# Patient Record
Sex: MALE | Race: WHITE | NOT HISPANIC OR LATINO | Employment: OTHER | URBAN - METROPOLITAN AREA
[De-identification: names, ages, dates, MRNs, and addresses within clinical notes are randomized per-mention and may not be internally consistent; named-entity substitution may affect disease eponyms.]

---

## 2017-04-11 RX ORDER — CALCIUM CITRATE/VITAMIN D3 200MG-6.25
TABLET ORAL
Qty: 300 STRIP | Refills: 11 | Status: SHIPPED | OUTPATIENT
Start: 2017-04-11

## 2017-04-12 DIAGNOSIS — E11.9 DIABETES MELLITUS WITHOUT COMPLICATION: ICD-10-CM

## 2017-05-02 RX ORDER — GLUCOSAM/CHON-MSM1/C/MANG/BOSW 500-416.6
TABLET ORAL
Qty: 300 EACH | Refills: 11 | Status: SHIPPED | OUTPATIENT
Start: 2017-05-02

## 2017-05-18 ENCOUNTER — TELEPHONE (OUTPATIENT)
Dept: OPHTHALMOLOGY | Facility: CLINIC | Age: 77
End: 2017-05-18

## 2017-06-06 ENCOUNTER — TELEPHONE (OUTPATIENT)
Dept: FAMILY MEDICINE | Facility: CLINIC | Age: 77
End: 2017-06-06

## 2017-06-06 NOTE — TELEPHONE ENCOUNTER
----- Message from Gume Rucker sent at 6/6/2017  9:51 AM CDT -----  Contact: HRMARYCHUY  Good morning. Patient requesting a call to discuss his oxygen. Thanks.

## 2017-06-06 NOTE — LETTER
June 9, 2017    Silverio BRANDON Jakub  [address] [facility] [address]             [facility]  [address] [facility], [address]  [facility] [address]  Phone: [phone]  Fax: [phone] Dear Mr. Call:    I am unable to reach you via home phone (says memory is full) and your cell # says not set up. Please call [phone] in order to help you with your oxygen questions     If you have any questions or concerns, please don't hesitate to call.    Sincerely,    Татьяна Ly LPN   For Dr. Russell Mayo

## 2018-02-19 ENCOUNTER — PES CALL (OUTPATIENT)
Dept: ADMINISTRATIVE | Facility: CLINIC | Age: 78
End: 2018-02-19

## 2018-03-12 ENCOUNTER — PES CALL (OUTPATIENT)
Dept: ADMINISTRATIVE | Facility: CLINIC | Age: 78
End: 2018-03-12

## 2018-06-11 ENCOUNTER — OFFICE VISIT (OUTPATIENT)
Dept: UROLOGY | Facility: CLINIC | Age: 78
End: 2018-06-11
Payer: MEDICARE

## 2018-06-11 VITALS
WEIGHT: 282.19 LBS | HEART RATE: 79 BPM | SYSTOLIC BLOOD PRESSURE: 132 MMHG | BODY MASS INDEX: 36.22 KG/M2 | HEIGHT: 74 IN | DIASTOLIC BLOOD PRESSURE: 80 MMHG

## 2018-06-11 DIAGNOSIS — N40.1 ENLARGED PROSTATE WITH URINARY OBSTRUCTION: Primary | ICD-10-CM

## 2018-06-11 DIAGNOSIS — N13.8 ENLARGED PROSTATE WITH URINARY OBSTRUCTION: Primary | ICD-10-CM

## 2018-06-11 DIAGNOSIS — R33.9 URINARY RETENTION: ICD-10-CM

## 2018-06-11 PROCEDURE — 3075F SYST BP GE 130 - 139MM HG: CPT | Mod: CPTII,S$GLB,, | Performed by: UROLOGY

## 2018-06-11 PROCEDURE — 99203 OFFICE O/P NEW LOW 30 MIN: CPT | Mod: S$GLB,,, | Performed by: UROLOGY

## 2018-06-11 PROCEDURE — 99999 PR PBB SHADOW E&M-EST. PATIENT-LVL III: CPT | Mod: PBBFAC,,, | Performed by: UROLOGY

## 2018-06-11 PROCEDURE — 3079F DIAST BP 80-89 MM HG: CPT | Mod: CPTII,S$GLB,, | Performed by: UROLOGY

## 2018-06-11 RX ORDER — CLOPIDOGREL BISULFATE 75 MG/1
75 TABLET ORAL DAILY
COMMUNITY

## 2018-06-11 RX ORDER — CITALOPRAM 40 MG/1
40 TABLET, FILM COATED ORAL DAILY
COMMUNITY

## 2018-06-11 RX ORDER — LOSARTAN POTASSIUM 100 MG/1
100 TABLET ORAL DAILY
COMMUNITY

## 2018-06-11 RX ORDER — ALPRAZOLAM 0.5 MG/1
0.5 TABLET ORAL 3 TIMES DAILY
COMMUNITY

## 2018-06-11 RX ORDER — ZINC GLUCONATE 50 MG
50 TABLET ORAL DAILY
COMMUNITY

## 2018-06-11 RX ORDER — NYSTATIN 100000 [USP'U]/G
POWDER TOPICAL
Refills: 1 | COMMUNITY
Start: 2018-03-09

## 2018-06-11 RX ORDER — ATORVASTATIN CALCIUM 10 MG/1
10 TABLET, FILM COATED ORAL DAILY
COMMUNITY

## 2018-06-11 RX ORDER — HYDROCODONE BITARTRATE AND ACETAMINOPHEN 10; 325 MG/1; MG/1
1 TABLET ORAL
COMMUNITY

## 2018-06-11 RX ORDER — ACETAMINOPHEN 325 MG/1
325 TABLET ORAL EVERY 6 HOURS PRN
COMMUNITY

## 2018-06-11 RX ORDER — MAGNESIUM 30 MG
TABLET ORAL ONCE
COMMUNITY

## 2018-06-11 NOTE — LETTER
June 11, 2018      Garfield Lopez MD  [address] [facility] [address]           [facility]  [address] [facility] [address] [place] [address]  Phone: [phone]          Patient: Silverio Call   MR Number: [id number]   YOB: 1940   Date of Visit: 6/11/2018       Dear Dr. Garfield Lopez:    Thank you for referring Silverio Call to me for evaluation. Attached you will find relevant portions of my assessment and plan of care.    If you have questions, please do not hesitate to call me. I look forward to following Silverio Call along with you.    Sincerely,    JAYY Hartman MD    Enclosure  CC:  No Recipients    If you would like to receive this communication electronically, please contact [email or web address] or [phone] to request more information on [employer] Link access.    For providers and/or their staff who would like to refer a patient to [facility], please contact us through our one-stop-shop provider referral line, [facility], at [phone].    If you feel you have received this communication in error or would no longer like to receive these types of communications, please e-mail [email or web address]

## 2018-06-11 NOTE — PROGRESS NOTES
Subjective:       Patient ID: Silverio Call is a 77 y.o. male.    Chief Complaint: Advice Only    HPI     77 year old nursing home patient.  He is seen today with his son who give most of his medical history.  He had recent PE now on blood thinners.  Had stent Dr. Porter.  He was hospitalized for 2 weeks and now in [facility] for the last several weeks.  A luis was placed while hospitalized and apparently has been in place since.  He had no voiding problems prior of hospitalization.  He has taken Flomax for the last 2 years.  He is non-ambulatory.  Mostly non-verbal.  Son answers questions.      Past Medical History:   Diagnosis Date    Arthritis     Cataract     CHF (congestive heart failure)     Diabetes mellitus     Diabetes mellitus type II     Glaucoma     Hypercalcemia     Hypertension     Hypothyroidism     On home oxygen therapy     Thyroid disease     hypothyroidism     Past Surgical History:   Procedure Laterality Date    CATARACT EXTRACTION      Both Eyes    HERNIA REPAIR Bilateral     TONSILLECTOMY         Current Outpatient Prescriptions:     acetaminophen (TYLENOL) 325 MG tablet, Take 325 mg by mouth every 6 (six) hours as needed for Pain., Disp: , Rfl:     ALPRAZolam (XANAX) 0.5 MG tablet, Take 0.5 mg by mouth 3 (three) times daily., Disp: , Rfl:     apixaban (ELIQUIS) 5 mg Tab, Take 5 mg by mouth 2 (two) times daily., Disp: , Rfl:     arginine-glutamine-calcium HMB 7-7-1.5 gram PwPk, Take by mouth once daily., Disp: , Rfl:     aspirin 325 MG tablet, Take 81 mg by mouth once daily. , Disp: , Rfl:     atorvastatin (LIPITOR) 10 MG tablet, Take 10 mg by mouth once daily., Disp: , Rfl:     blood-glucose meter (TRUE METRIX AIR GLUCOSE METER) kit, USE AS INSTRUCTED, Disp: 1 each, Rfl: 0    brimonidine 0.2% (ALPHAGAN) 0.2 % Drop, Place 1 drop into both eyes 3 (three) times daily., Disp: 30 mL, Rfl: 3    citalopram (CELEXA) 40 MG tablet, Take 40 mg by mouth once daily., Disp: ,  "Rfl:     clopidogrel (PLAVIX) 75 mg tablet, Take 75 mg by mouth once daily., Disp: , Rfl:     comp stocking,knee,long,large Misc, 1 Pair by Misc.(Non-Drug; Combo Route) route once daily., Disp: 1 Pair, Rfl: 1    econazole nitrate 1 % cream, APPLY TO THE AFFECTED AREA OF FEET DAILY, Disp: 85 g, Rfl: 3    famotidine (PEPCID) 20 MG tablet, Take 1 tablet (20 mg total) by mouth 2 (two) times daily., Disp: 180 tablet, Rfl: 3    HYDROcodone-acetaminophen (NORCO)  mg per tablet, Take 1 tablet by mouth., Disp: , Rfl:     insulin glargine (LANTUS SOLOSTAR) 100 unit/mL (3 mL) InPn pen, INJECT SUBCUTANEOUSLY  18 UNITS AT BEDTIME, Disp: 30 mL, Rfl: 11    insulin lispro protamin-lispro 100 unit/mL (50-50) InPn, Inject into the skin., Disp: , Rfl:     insulin needles, disposable, (BD ULTRA-FINE NIRMALA PEN NEEDLES) 32 x 5/32 " Ndle, 1 each by Misc.(Non-Drug; Combo Route) route once daily., Disp: 30 each, Rfl: 11    ketoconazole (NIZORAL) 2 % cream, APPLY EXTERNALLY TO THE AFFECTED AREA TWICE DAILY, Disp: 60 g, Rfl: 3    lancets Misc, Pt is using x3 daily, Disp: 100 each, Rfl: 11    latanoprost 0.005 % ophthalmic solution, Place 1 drop into both eyes every evening. 90 day supply = 3 bottles, Disp: 3 Bottle, Rfl: 3    levothyroxine (SYNTHROID) 100 MCG tablet, TAKE 1 TABLET DAILY BEFORE BREAKFAST (Patient taking differently: Take 125 mcg by mouth. TAKE 1 TABLET DAILY BEFORE BREAKFAST), Disp: 90 tablet, Rfl: 3    losartan (COZAAR) 100 MG tablet, Take 100 mg by mouth once daily., Disp: , Rfl:     magnesium 30 mg Tab, Take by mouth once., Disp: , Rfl:     mupirocin (BACTROBAN) 2 % ointment, , Disp: , Rfl:     NYSTOP powder, LORRIE TOPICALLY UTD BID, Disp: , Rfl: 1    pen needle, diabetic (BD INSULIN PEN NEEDLE UF SHORT) 31 gauge x 5/16" Ndle, USE  1  NEEDLE  TO INJECT  INSULIN SUBCUTANEOUSLY AT BEDTIME, Disp: 100 each, Rfl: 11    tamsulosin (FLOMAX) 0.4 mg Cp24, Take 1 capsule (0.4 mg total) by mouth once daily., Disp: " 90 capsule, Rfl: 3    triamcinolone acetonide 0.1% (KENALOG) 0.1 % cream, Apply 1 application topically 2 (two) times daily. Apply to affected area, Disp: 453 g, Rfl: 3    TRUE METRIX GLUCOSE TEST STRIP Strp, USE THREE TIMES DAILY, Disp: 300 strip, Rfl: 11    TRUEPLUS LANCETS 28 gauge Misc, USE THREE TIMES DAILY, Disp: 300 each, Rfl: 11    zinc gluconate 50 mg tablet, Take 50 mg by mouth once daily., Disp: , Rfl:     valsartan (DIOVAN) 160 MG tablet, Take 1 tablet (160 mg total) by mouth once daily., Disp: 90 tablet, Rfl: 3      Review of Systems   Unable to perform ROS: Other       Objective:      Physical Exam   Constitutional: He is oriented to person, place, and time. He appears well-developed and well-nourished.   HENT:   Head: Normocephalic and atraumatic.   Eyes: Conjunctivae are normal.   Cardiovascular: Normal rate.    Pulmonary/Chest: Effort normal.   Genitourinary: Penis normal.   Genitourinary Comments: Ordoñez in place.  Urine clear.   Musculoskeletal: Normal range of motion.   Neurological: He is alert and oriented to person, place, and time.   Skin: Skin is warm and dry. No rash noted.   Psychiatric: He has a normal mood and affect.   Vitals reviewed.      Assessment:       1. Enlarged prostate with urinary obstruction    2. Urinary retention        Plan:       Enlarged prostate with urinary obstruction  -     POCT URINE DIPSTICK WITHOUT MICROSCOPE    Urinary retention      Voiding trial.  Catheter removed today.  Monitor UOP/wet diapers.  RTC 2-3 days for PVR

## 2018-06-14 ENCOUNTER — OFFICE VISIT (OUTPATIENT)
Dept: UROLOGY | Facility: CLINIC | Age: 78
End: 2018-06-14
Payer: MEDICARE

## 2018-06-14 DIAGNOSIS — R33.9 URINARY RETENTION: ICD-10-CM

## 2018-06-14 DIAGNOSIS — N13.8 ENLARGED PROSTATE WITH URINARY OBSTRUCTION: Primary | ICD-10-CM

## 2018-06-14 DIAGNOSIS — N40.1 ENLARGED PROSTATE WITH URINARY OBSTRUCTION: Primary | ICD-10-CM

## 2018-06-14 PROCEDURE — 99213 OFFICE O/P EST LOW 20 MIN: CPT | Mod: S$GLB,,, | Performed by: UROLOGY

## 2018-06-14 PROCEDURE — 99999 PR PBB SHADOW E&M-EST. PATIENT-LVL I: CPT | Mod: PBBFAC,,, | Performed by: UROLOGY

## 2018-06-14 NOTE — PROGRESS NOTES
Subjective:       Patient ID: Silverio Call is a 77 y.o. male.    Chief Complaint: No chief complaint on file.    HPI     History of urinary retention.  Ordoñez removed earlier this week.  He is voiding/wet diaper.  No complaints today.  PVR is 131 ml    Review of Systems   Constitutional: Negative for fever.   Genitourinary: Negative for dysuria and hematuria.       Objective:      Physical Exam   Constitutional: He is oriented to person, place, and time. He appears well-developed and well-nourished.   Pulmonary/Chest: Effort normal.   Neurological: He is alert and oriented to person, place, and time.   Skin: No rash noted.   Psychiatric: He has a normal mood and affect.   Vitals reviewed.      Assessment:       1. Enlarged prostate with urinary obstruction    2. Urinary retention        Plan:       Enlarged prostate with urinary obstruction    Urinary retention      Continue Flomax.  F/u as needed.

## 2018-07-20 ENCOUNTER — PES CALL (OUTPATIENT)
Dept: ADMINISTRATIVE | Facility: CLINIC | Age: 78
End: 2018-07-20

## 2018-08-07 ENCOUNTER — LAB VISIT (OUTPATIENT)
Dept: LAB | Facility: HOSPITAL | Age: 78
End: 2018-08-07
Attending: INTERNAL MEDICINE
Payer: MEDICARE

## 2018-08-07 DIAGNOSIS — I27.0 PRIMARY PULMONARY HYPERTENSION: Primary | ICD-10-CM

## 2018-08-07 LAB
ANION GAP SERPL CALC-SCNC: 11 MMOL/L
BUN SERPL-MCNC: 47 MG/DL
CALCIUM SERPL-MCNC: 9.2 MG/DL
CHLORIDE SERPL-SCNC: 101 MMOL/L
CO2 SERPL-SCNC: 29 MMOL/L
CREAT SERPL-MCNC: 1.7 MG/DL
EST. GFR  (AFRICAN AMERICAN): 44 ML/MIN/1.73 M^2
EST. GFR  (NON AFRICAN AMERICAN): 38.1 ML/MIN/1.73 M^2
GLUCOSE SERPL-MCNC: 124 MG/DL
POTASSIUM SERPL-SCNC: 3.3 MMOL/L
SODIUM SERPL-SCNC: 141 MMOL/L

## 2018-08-07 PROCEDURE — 80048 BASIC METABOLIC PNL TOTAL CA: CPT

## 2018-08-28 ENCOUNTER — OFFICE VISIT (OUTPATIENT)
Dept: PODIATRY | Facility: CLINIC | Age: 78
End: 2018-08-28
Payer: MEDICARE

## 2018-08-28 VITALS — WEIGHT: 281.94 LBS | BODY MASS INDEX: 37.37 KG/M2 | HEIGHT: 73 IN

## 2018-08-28 DIAGNOSIS — E11.49 TYPE II DIABETES MELLITUS WITH NEUROLOGICAL MANIFESTATIONS: ICD-10-CM

## 2018-08-28 DIAGNOSIS — N18.30 CKD (CHRONIC KIDNEY DISEASE) STAGE 3, GFR 30-59 ML/MIN: ICD-10-CM

## 2018-08-28 DIAGNOSIS — E11.51 TYPE II DIABETES MELLITUS WITH PERIPHERAL CIRCULATORY DISORDER: Primary | ICD-10-CM

## 2018-08-28 DIAGNOSIS — B35.1 ONYCHOMYCOSIS DUE TO DERMATOPHYTE: ICD-10-CM

## 2018-08-28 DIAGNOSIS — E66.01 MORBID OBESITY: ICD-10-CM

## 2018-08-28 PROCEDURE — 99202 OFFICE O/P NEW SF 15 MIN: CPT | Mod: 25,S$GLB,, | Performed by: PODIATRIST

## 2018-08-28 PROCEDURE — 11721 DEBRIDE NAIL 6 OR MORE: CPT | Mod: Q9,S$GLB,, | Performed by: PODIATRIST

## 2018-08-28 PROCEDURE — 99999 PR PBB SHADOW E&M-EST. PATIENT-LVL III: CPT | Mod: PBBFAC,,, | Performed by: PODIATRIST

## 2018-08-28 PROCEDURE — 99499 UNLISTED E&M SERVICE: CPT | Mod: S$GLB,,, | Performed by: PODIATRIST

## 2018-08-28 NOTE — PROGRESS NOTES
Subjective:      Patient ID: Silvreio Call is a 78 y.o. male.    Chief Complaint: Diabetic Foot Exam    Silverio is a 78 y.o. male who presents to the clinic for evaluation and treatment of high risk feet. Silverio has a past medical history of Arthritis, Cataract, CHF (congestive heart failure), Diabetes mellitus, Diabetes mellitus type II, Glaucoma, Hypercalcemia, Hypertension, Hypothyroidism, On home oxygen therapy, and Thyroid disease. The patient's chief complaint is long, thick toenails. This patient has documented high risk feet requiring routine maintenance secondary to diabetes mellitis and those secondary complications of diabetes, as mentioned. Patient is wheel chair bound, accompanied by his son.    PCP: Ramy Francis MD    Date Last Seen by PCP: 8/24/18    Current shoe gear:   Casual shoes    Hemoglobin A1C   Date Value Ref Range Status   08/13/2015 8.1 (H) 4.5 - 6.2 % Final   04/22/2015 8.3 (H) 4.5 - 6.2 % Final   11/24/2014 7.3 (H) 4.5 - 6.2 % Final       Review of Systems   Constitution: Negative for chills and fever.   Cardiovascular: Positive for leg swelling. Negative for claudication.   Respiratory: Negative for shortness of breath.    Skin: Positive for nail changes. Negative for itching and rash.   Musculoskeletal: Positive for muscle weakness and myalgias. Negative for muscle cramps.   Gastrointestinal: Negative for nausea and vomiting.   Neurological: Positive for numbness and paresthesias. Negative for focal weakness and loss of balance.           Objective:      Physical Exam   Constitutional: He is oriented to person, place, and time. He appears well-developed and well-nourished. No distress.   Cardiovascular:   Pulses:       Dorsalis pedis pulses are 1+ on the right side, and 1+ on the left side.        Posterior tibial pulses are 1+ on the right side, and 1+ on the left side.   < 3 sec capillary refill time to toes 1-5 bilateral. Feet are warm to touch proximally with distal cooling b/l. There  is no hair growth on the feet and toes b/l. There is 2+ pitting edema b/l.      Musculoskeletal:   Equinus noted b/l ankles with < 10 deg DF noted. Passive range of motion of ankle and pedal joints is painless b/l.     Neurological: He is alert and oriented to person, place, and time. He has normal strength. He displays no atrophy and no tremor. A sensory deficit is present. He exhibits normal muscle tone.   Negative tinel sign bilateral. Diminished vibratory and protective sensation bilateral   Skin: Skin is warm, dry and intact. No abrasion, no bruising, no burn, no ecchymosis, no laceration, no lesion, no petechiae and no rash noted. He is not diaphoretic. No cyanosis or erythema. No pallor. Nails show no clubbing.   Skin is thin and atrophic bilateral    Nails 1-5 bilateral are thick 3-4 mm, long 3-6 mm, and discolored with subungual debris     Psychiatric: He has a normal mood and affect. His behavior is normal.             Assessment:       Encounter Diagnoses   Name Primary?    Type II diabetes mellitus with peripheral circulatory disorder Yes    Type II diabetes mellitus with neurological manifestations     Onychomycosis due to dermatophyte     CKD (chronic kidney disease) stage 3, GFR 30-59 ml/min     Morbid obesity          Plan:       Silverio was seen today for diabetic foot exam.    Diagnoses and all orders for this visit:    Type II diabetes mellitus with peripheral circulatory disorder    Type II diabetes mellitus with neurological manifestations    Onychomycosis due to dermatophyte    CKD (chronic kidney disease) stage 3, GFR 30-59 ml/min    Morbid obesity      I counseled the patient on his conditions, their implications and medical management.    Shoe inspection. Diabetic Foot Education. Patient reminded of the importance of good nutrition and blood sugar control to help prevent podiatric complications of diabetes. Patient instructed on proper foot hygeine. We discussed wearing proper shoe gear,  daily foot inspections, never walking without protective shoe gear, never putting sharp instruments to feet    - With patient's permission, nails were aggressively reduced and debrided x 10 to their soft tissue attachment mechanically removing all offending nail and debris. Patient relates relief following the procedure. he will continue to monitor the areas daily, inspect his feet, wear protective shoe gear when ambulatory, moisturizer to maintain skin integrity.    CKD managed by primary    Discussed importance of healthy diet and weight loss as to his diabetes control.    Discussed returning in 3 months routine care, patient and sone would like to come back in 1-2 months. Discussed that this would not be covered by insurance as there is a limit to how often they can have this done with insurance paying for it, they will return in 1 month Proc B then set up for every other visit as proc B.    Chinmay Arellano DPM

## 2018-09-03 PROBLEM — E11.51 TYPE II DIABETES MELLITUS WITH PERIPHERAL CIRCULATORY DISORDER: Status: ACTIVE | Noted: 2018-09-03

## 2018-09-03 PROBLEM — E11.49 TYPE II DIABETES MELLITUS WITH NEUROLOGICAL MANIFESTATIONS: Status: ACTIVE | Noted: 2018-09-03

## 2018-09-03 PROBLEM — B35.1 ONYCHOMYCOSIS DUE TO DERMATOPHYTE: Status: ACTIVE | Noted: 2018-09-03

## 2018-09-11 PROBLEM — L02.31 ABSCESS OF BUTTOCK, LEFT: Status: ACTIVE | Noted: 2018-09-11

## 2018-09-24 ENCOUNTER — TELEPHONE (OUTPATIENT)
Dept: OPTOMETRY | Facility: CLINIC | Age: 78
End: 2018-09-24

## 2018-09-24 NOTE — TELEPHONE ENCOUNTER
Returned call, no answer.  L/M regarding scheduling appointment at first available and adding it to the Wait List.   Pt can call back to schedule.

## 2018-10-02 ENCOUNTER — TELEPHONE (OUTPATIENT)
Dept: PODIATRY | Facility: CLINIC | Age: 78
End: 2018-10-02

## 2018-10-02 NOTE — TELEPHONE ENCOUNTER
----- Message from Cristina Barron sent at 10/2/2018 10:39 AM CDT -----  Type: Needs Medical Advice    Who Called: wife-Sienna Call     Best Call Back Number: [phone]    Additional Information: Wife called to cancel his procedure today, please contact to reschedule

## 2018-10-10 ENCOUNTER — OFFICE VISIT (OUTPATIENT)
Dept: OPHTHALMOLOGY | Facility: CLINIC | Age: 78
End: 2018-10-10
Payer: MEDICARE

## 2018-10-10 DIAGNOSIS — E11.9 DIABETES MELLITUS TYPE 2 WITHOUT RETINOPATHY: Primary | ICD-10-CM

## 2018-10-10 DIAGNOSIS — H40.1112 PRIMARY OPEN ANGLE GLAUCOMA (POAG) OF RIGHT EYE, MODERATE STAGE: ICD-10-CM

## 2018-10-10 DIAGNOSIS — H43.813 POSTERIOR VITREOUS DETACHMENT OF BOTH EYES: ICD-10-CM

## 2018-10-10 DIAGNOSIS — H40.1113 PRIMARY OPEN ANGLE GLAUCOMA (POAG) OF RIGHT EYE, SEVERE STAGE: ICD-10-CM

## 2018-10-10 PROCEDURE — 99499 UNLISTED E&M SERVICE: CPT | Mod: S$GLB,,, | Performed by: OPHTHALMOLOGY

## 2018-10-10 PROCEDURE — 92004 COMPRE OPH EXAM NEW PT 1/>: CPT | Mod: S$PBB,,, | Performed by: OPHTHALMOLOGY

## 2018-10-10 PROCEDURE — 99213 OFFICE O/P EST LOW 20 MIN: CPT | Mod: PBBFAC,PO | Performed by: OPHTHALMOLOGY

## 2018-10-10 PROCEDURE — 99999 PR PBB SHADOW E&M-EST. PATIENT-LVL III: CPT | Mod: PBBFAC,,, | Performed by: OPHTHALMOLOGY

## 2018-10-10 RX ORDER — ALPRAZOLAM 0.25 MG/1
TABLET ORAL
COMMUNITY
Start: 2018-10-03

## 2018-10-10 RX ORDER — BENZONATATE 200 MG/1
CAPSULE ORAL
Refills: 0 | COMMUNITY
Start: 2018-10-02

## 2018-10-10 RX ORDER — LATANOPROST 50 UG/ML
1 SOLUTION/ DROPS OPHTHALMIC NIGHTLY
Qty: 3 BOTTLE | Refills: 6 | Status: SHIPPED | OUTPATIENT
Start: 2018-10-10 | End: 2018-10-10

## 2018-10-10 RX ORDER — LATANOPROST 50 UG/ML
1 SOLUTION/ DROPS OPHTHALMIC NIGHTLY
Qty: 3 BOTTLE | Refills: 6 | Status: SHIPPED | OUTPATIENT
Start: 2018-10-10 | End: 2019-10-10

## 2018-10-10 RX ORDER — ISOPROPYL ALCOHOL 0.75 G/1
SWAB TOPICAL
COMMUNITY
Start: 2018-07-31

## 2018-10-10 RX ORDER — LOPERAMIDE HYDROCHLORIDE 2 MG/1
CAPSULE ORAL
Refills: 0 | COMMUNITY
Start: 2018-09-24

## 2018-10-10 RX ORDER — CLONIDINE HYDROCHLORIDE 0.1 MG/1
TABLET ORAL
COMMUNITY
Start: 2018-08-10

## 2018-10-10 RX ORDER — METOLAZONE 2.5 MG/1
TABLET ORAL
Refills: 3 | COMMUNITY
Start: 2018-08-14

## 2018-10-10 RX ORDER — PROMETHAZINE HYDROCHLORIDE AND DEXTROMETHORPHAN HYDROBROMIDE 6.25; 15 MG/5ML; MG/5ML
SYRUP ORAL
COMMUNITY
Start: 2018-10-09

## 2018-10-10 RX ORDER — FUROSEMIDE 40 MG/1
TABLET ORAL
COMMUNITY
Start: 2018-08-10

## 2018-10-10 NOTE — PATIENT INSTRUCTIONS
"  What Is Glaucoma?    Glaucoma is an eye disease that can cause blindness. If caught early, it can usually be controlled. But it often has no symptoms, so you need regular eye exams. Glaucoma usually begins when pressure builds up in the eye. This pressure can damage the optic nerve. The optic nerve sends messages to the brain so you can see. There are two main kinds of glaucoma: "open-angle" and "closed-angle."  Drainage area  The eye is always producing fluid. The eye's drainage areas may become clogged or blocked. Too much fluid stays in the eye. This increases eye pressure.  Optic nerve  Too much pressure in the eye can damage the optic nerve. If damaged, this nerve cannot send the messages to the brain that let you see.  Open-Angle Glaucoma  Open-angle is the most common kind of glaucoma. It occurs slowly as people age. The drainage area in the eye becomes clogged. Not enough fluid drains from the eye, so pressure slowly builds up. This causes gradual loss of side (peripheral) vision. You may not even notice changes until much of your vision is lost.  Closed-Angle Glaucoma  Closed-angle glaucoma is less common than open-angle. It usually comes on quickly. The drainage area in the eye suddenly becomes completely blocked. Eye pressure builds rapidly. You may notice blurred vision and rainbow halos around lights. You may also have headaches, nausea, vomiting, and severe pain. If not treated right away, blindness can occur quickly.  Date Last Reviewed: 6/1/2015  © 4328-0981 [employer]. [address] [place] [address], [place] [address]. All rights reserved. This information is not intended as a substitute for professional medical care. Always follow your healthcare professional's instructions.        "

## 2018-10-10 NOTE — LETTER
October 10, 2018      Radha Forde MD  [address]  [facility] [address]           [facility]  [address] [facility] [address] [place] [address]  Phone: [phone]  Fax: [phone]          Patient: Silverio Call   MR Number: [id number]   YOB: 1940   Date of Visit: 10/10/2018       Dear Dr. Radha Forde:    Thank you for referring Silverio Call to me for evaluation. Attached you will find relevant portions of my assessment and plan of care.    If you have questions, please do not hesitate to call me. I look forward to following Silverio Call along with you.    Sincerely,    Enio Clements Jr., MD    Enclosure  CC:  No Recipients    If you would like to receive this communication electronically, please contact [email or web address] or [phone] to request more information on [employer] Link access.    For providers and/or their staff who would like to refer a patient to [facility], please contact us through our one-stop-shop provider referral line, [facility], at [phone].    If you feel you have received this communication in error or would no longer like to receive these types of communications, please e-mail [email or web address]

## 2018-10-10 NOTE — PROGRESS NOTES
HPI     Glaucoma      Additional comments: not on any drops at this time// notoroberto seen   last //  notoroberto last seen 2017//                Blurred Vision      Additional comments: lost va due to GL  in OD ? //  Mod OS              Comments     not on any drops at this time// notoroberto seen last // notoroberto last   seen 2017//  Son not sure Notoroberto even new he was on drops//  lost va due to GL  in OD ? //  Mod OS      Pt very uncooperative.  Didn't want to try to cooperate with the   refraction, he was better when I was taking his va,  He was verbally   abusive to his son and then nice to his son,   but nice to me. Pt wanted   to leave.  Pt states that pt had had an accident in his pants and wanted   to leave.            Last edited by Delphine Fischer on 10/10/2018  3:02 PM. (History)        ROS     Negative for: Constitutional, Gastrointestinal, Neurological, Skin,   Genitourinary, Musculoskeletal, HENT, Endocrine, Cardiovascular, Eyes,   Respiratory, Psychiatric, Allergic/Imm, Heme/Lymph    Last edited by Enio Clements Jr., MD on 10/10/2018  3:27 PM. (History)        Assessment /Plan     For exam results, see Encounter Report.    Diabetes mellitus type 2 without retinopathy  -no retinopathy seen on DFE today  -continue good blood pressure and glucose control  -F/U for yearly DFE  Primary open angle glaucoma (POAG) of right eye, severe stage  -     latanoprost 0.005 % ophthalmic solution; Place 1 drop into both eyes every evening. 90 day supply = 3 bottles  Dispense: 3 Bottle; Refill: 6    Primary open angle glaucoma (POAG) of right eye, moderate stage  -     latanoprost 0.005 % ophthalmic solution; Place 1 drop into both eyes every evening. 90 day supply = 3 bottles  Dispense: 3 Bottle; Refill: 6  Follow-up in about 6 months (around 4/10/2019) for IOP and Medication check.  Posterior vitreous detachment of both eyes  Patient was counseled on the following:  IF YOU HAVE:  1. Increase in  floaters or flashing lights  2. Worsening vision  3. Appearance of a persistent curtain  4. Shadow anywhere in the field  of vision  Return immediately if these symptoms develop.    Other orders  -     Discontinue: latanoprost 0.005 % ophthalmic solution; Place 1 drop into both eyes every evening. 90 day supply = 3 bottles  Dispense: 3 Bottle; Refill: 6

## 2018-10-11 ENCOUNTER — TELEPHONE (OUTPATIENT)
Dept: OPHTHALMOLOGY | Facility: CLINIC | Age: 78
End: 2018-10-11

## 2018-10-15 ENCOUNTER — OFFICE VISIT (OUTPATIENT)
Dept: OPHTHALMOLOGY | Facility: CLINIC | Age: 78
End: 2018-10-15
Payer: MEDICARE

## 2018-10-15 ENCOUNTER — OFFICE VISIT (OUTPATIENT)
Dept: PODIATRY | Facility: CLINIC | Age: 78
End: 2018-10-15

## 2018-10-15 VITALS — HEIGHT: 73 IN | WEIGHT: 281.06 LBS | BODY MASS INDEX: 37.25 KG/M2

## 2018-10-15 DIAGNOSIS — B35.1 ONYCHOMYCOSIS DUE TO DERMATOPHYTE: ICD-10-CM

## 2018-10-15 DIAGNOSIS — H52.7 REFRACTIVE ERROR: ICD-10-CM

## 2018-10-15 DIAGNOSIS — E11.49 TYPE II DIABETES MELLITUS WITH NEUROLOGICAL MANIFESTATIONS: ICD-10-CM

## 2018-10-15 DIAGNOSIS — H40.1112 PRIMARY OPEN ANGLE GLAUCOMA (POAG) OF RIGHT EYE, MODERATE STAGE: ICD-10-CM

## 2018-10-15 DIAGNOSIS — H40.1113 PRIMARY OPEN ANGLE GLAUCOMA (POAG) OF RIGHT EYE, SEVERE STAGE: ICD-10-CM

## 2018-10-15 DIAGNOSIS — E11.51 TYPE II DIABETES MELLITUS WITH PERIPHERAL CIRCULATORY DISORDER: Primary | ICD-10-CM

## 2018-10-15 DIAGNOSIS — Z96.1 PSEUDOPHAKIA OF BOTH EYES: Primary | ICD-10-CM

## 2018-10-15 PROCEDURE — 17999 UNLISTD PX SKN MUC MEMB SUBQ: CPT | Mod: CSM,,, | Performed by: PODIATRIST

## 2018-10-15 PROCEDURE — 99999 PR PBB SHADOW E&M-EST. PATIENT-LVL III: CPT | Mod: PBBFAC,,, | Performed by: PODIATRIST

## 2018-10-15 PROCEDURE — 99212 OFFICE O/P EST SF 10 MIN: CPT | Mod: PBBFAC,PO | Performed by: OPHTHALMOLOGY

## 2018-10-15 PROCEDURE — 99999 PR PBB SHADOW E&M-EST. PATIENT-LVL II: CPT | Mod: PBBFAC,,, | Performed by: OPHTHALMOLOGY

## 2018-10-15 PROCEDURE — 92012 INTRM OPH EXAM EST PATIENT: CPT | Mod: S$PBB,,, | Performed by: OPHTHALMOLOGY

## 2018-10-15 PROCEDURE — 92015 DETERMINE REFRACTIVE STATE: CPT | Mod: ,,, | Performed by: OPHTHALMOLOGY

## 2018-10-15 PROCEDURE — 99499 UNLISTED E&M SERVICE: CPT | Mod: S$GLB,,, | Performed by: PODIATRIST

## 2018-10-15 NOTE — PROGRESS NOTES
Subjective:      Patient ID: Silverio Call is a 78 y.o. male.    Chief Complaint: Nail Care (PROC-B,PCP--09/25/2018) and Diabetes Mellitus (A1c-8.1-08/13/2015)    Silverio is a 78 y.o. male who presents to the clinic for evaluation and treatment of high risk feet. Silverio has a past medical history of Arthritis, Cataract, CHF (congestive heart failure), Diabetes mellitus, Diabetes mellitus type II, Glaucoma, Hypercalcemia, Hypertension, Hypothyroidism, On home oxygen therapy, and Thyroid disease. The patient's chief complaint is long, thick toenails. This patient has documented high risk feet requiring routine maintenance secondary to diabetes mellitis and those secondary complications of diabetes, as mentioned. Patient is wheel chair bound, accompanied by his son. Here for Proc b as he is too early for his routine nail care.    PCP: Ramy Francis MD    Date Last Seen by PCP: 8/24/18    Current shoe gear:   Casual shoes    Hemoglobin A1C   Date Value Ref Range Status   08/13/2015 8.1 (H) 4.5 - 6.2 % Final   04/22/2015 8.3 (H) 4.5 - 6.2 % Final   11/24/2014 7.3 (H) 4.5 - 6.2 % Final       Review of Systems   Constitution: Negative for chills and fever.   Cardiovascular: Positive for leg swelling. Negative for claudication.   Respiratory: Negative for shortness of breath.    Skin: Positive for nail changes. Negative for itching and rash.   Musculoskeletal: Positive for muscle weakness and myalgias. Negative for muscle cramps.   Gastrointestinal: Negative for nausea and vomiting.   Neurological: Positive for numbness and paresthesias. Negative for focal weakness and loss of balance.           Objective:      Physical Exam   Constitutional: He is oriented to person, place, and time. He appears well-developed and well-nourished. No distress.   Cardiovascular:   Pulses:       Dorsalis pedis pulses are 1+ on the right side, and 1+ on the left side.        Posterior tibial pulses are 1+ on the right side, and 1+ on the  left side.   < 3 sec capillary refill time to toes 1-5 bilateral. Feet are warm to touch proximally with distal cooling b/l. There is no hair growth on the feet and toes b/l. There is 2+ pitting edema b/l.      Musculoskeletal:   Equinus noted b/l ankles with < 10 deg DF noted. Passive range of motion of ankle and pedal joints is painless b/l.     Neurological: He is alert and oriented to person, place, and time. He has normal strength. He displays no atrophy and no tremor. A sensory deficit is present. He exhibits normal muscle tone.   Negative tinel sign bilateral. Diminished vibratory and protective sensation bilateral   Skin: Skin is warm, dry and intact. No abrasion, no bruising, no burn, no ecchymosis, no laceration, no lesion, no petechiae and no rash noted. He is not diaphoretic. No cyanosis or erythema. No pallor. Nails show no clubbing.   Skin is thin and atrophic bilateral    Nails 1-5 bilateral are thick 3-4 mm, long 3-6 mm, and discolored with subungual debris     Psychiatric: He has a normal mood and affect. His behavior is normal.             Assessment:       Encounter Diagnoses   Name Primary?    Type II diabetes mellitus with peripheral circulatory disorder Yes    Type II diabetes mellitus with neurological manifestations     Onychomycosis due to dermatophyte          Plan:       Silverio was seen today for nail care and diabetes mellitus.    Diagnoses and all orders for this visit:    Type II diabetes mellitus with peripheral circulatory disorder    Type II diabetes mellitus with neurological manifestations    Onychomycosis due to dermatophyte      I counseled the patient on his conditions, their implications and medical management.    Shoe inspection. Diabetic Foot Education. Patient reminded of the importance of good nutrition and blood sugar control to help prevent podiatric complications of diabetes. Patient instructed on proper foot hygeine. We discussed wearing proper shoe gear, daily foot  inspections, never walking without protective shoe gear, never putting sharp instruments to feet    - With patient's permission, nails were aggressively reduced and debrided x 10 to their soft tissue attachment mechanically removing all offending nail and debris. Patient relates relief following the procedure. he will continue to monitor the areas daily, inspect his feet, wear protective shoe gear when ambulatory, moisturizer to maintain skin integrity.    Return 2 months routine care    Chinmay Arellano DPM

## 2018-10-15 NOTE — PROGRESS NOTES
HPI     Blurred Vision      Additional comments: mrx only//              Comments     mrx only//          Last edited by Delphine Fischer on 10/15/2018  3:39 PM. (History)            Assessment /Plan     For exam results, see Encounter Report.    Pseudophakia of both eyes- stable; observe    Refractive error- rx for glasses    Primary open angle glaucoma (POAG) of right eye, severe stage- continue with current drop regimen    Primary open angle glaucoma (POAG) of right eye, moderate stage- continue with current drop regimen

## 2018-10-22 PROBLEM — S81.811A LACERATION OF RIGHT LEG EXCLUDING THIGH, INITIAL ENCOUNTER: Status: ACTIVE | Noted: 2018-10-22

## 2024-12-28 NOTE — LETTER
April 11, 2017    [place] Call  [address] [facility] [address]             [place] [facility]  [address] [facility] [address]  Phone: [phone]  Fax: [phone] Dear Mr. Call:    We recently refilled your True Metrix glucose testing strips. Dr. Mayo says you are due for your Annual visit/ labs . Please call to schedule @ [phone], phone staff would be happy to assist you.     If you have any questions or concerns, please don't hesitate to call.    Sincerely,    Татьяна Ly LPN   For Dr. Mayo     
No